# Patient Record
Sex: MALE | URBAN - METROPOLITAN AREA
[De-identification: names, ages, dates, MRNs, and addresses within clinical notes are randomized per-mention and may not be internally consistent; named-entity substitution may affect disease eponyms.]

---

## 2024-03-08 ENCOUNTER — TRANSCRIBE ORDERS (OUTPATIENT)
Dept: CARDIAC REHAB | Facility: HOSPITAL | Age: 64
End: 2024-03-08

## 2024-03-08 DIAGNOSIS — Z94.1 HEART TRANSPLANT RECIPIENT: Primary | ICD-10-CM

## 2024-04-12 ENCOUNTER — TREATMENT (OUTPATIENT)
Dept: CARDIAC REHAB | Facility: HOSPITAL | Age: 64
End: 2024-04-12
Payer: OTHER GOVERNMENT

## 2024-04-12 DIAGNOSIS — Z94.1 HEART TRANSPLANT RECIPIENT: ICD-10-CM

## 2024-04-12 PROCEDURE — 93798 PHYS/QHP OP CAR RHAB W/ECG: CPT

## 2024-04-12 NOTE — PROGRESS NOTES
Attended Phase II Cardiac Rehab orientation. Medication and health history reconciled. Heart and lungs assessed by staff RN. See [facility] for details.

## 2024-04-18 ENCOUNTER — TREATMENT (OUTPATIENT)
Dept: CARDIAC REHAB | Facility: HOSPITAL | Age: 64
End: 2024-04-18
Payer: OTHER GOVERNMENT

## 2024-04-18 DIAGNOSIS — Z94.1 HEART TRANSPLANT RECIPIENT: Primary | ICD-10-CM

## 2024-04-18 PROCEDURE — 93798 PHYS/QHP OP CAR RHAB W/ECG: CPT

## 2024-04-18 PROCEDURE — 93797 PHYS/QHP OP CAR RHAB WO ECG: CPT

## 2024-04-18 NOTE — PROGRESS NOTES
Attended Phase II Cardiac Rehab. No medication or health history changes reported. See [facility] for details.  Introduction to exercise class #2  Exercise session #3

## 2024-04-24 ENCOUNTER — TREATMENT (OUTPATIENT)
Dept: CARDIAC REHAB | Facility: HOSPITAL | Age: 64
End: 2024-04-24
Payer: OTHER GOVERNMENT

## 2024-04-24 DIAGNOSIS — Z94.1 HEART TRANSPLANT RECIPIENT: Primary | ICD-10-CM

## 2024-04-24 PROCEDURE — 93798 PHYS/QHP OP CAR RHAB W/ECG: CPT

## 2024-04-24 NOTE — PROGRESS NOTES
NUTRITION ASSESSMENT & EDUCATION  CARDIAC/PULMONARY REHAB      Appointment Date and Time: 24, 15:54 EDT  Patient Name: José Miguel Capone   Age: 63 y.o.     Sex:  male      Employment/Activity Level:   Tan education level: college  Occupation:  retired                          Job Activity Level: N/A  Routine Exercise: {Desc; mild/moderate/stron}                                    Weight Assessment:   Height:   Ht Readings from Last 1 Encounters:   No data found for Ht     Weight:   Wt Readings from Last 1 Encounters:   No data found for Wt         BMI:    BMI Readings from Last 1 Encounters:   No data found for BMI       -------------------------------------------------------------------------------------------------  IBW: Height is required to calculate ideal body weight.  -------------------------------------------------------------------------------------------------  Weight Assessment: {Wt assess:974409991}  Weight Change last six months: ***       Usual Weight: *** lb       Desired Weight: *** lb    Cardiac Risk Factors:         Atherosclerotic heart disease       Hyperlipidemia/hypercholesterolemia       Obesity      Heart transplant recipient, DM II, PE, gout, GERD, venous insufficiency, ANTONIO    Pertinent Lab Values:     Total Cholesterol   Date Value Ref Range Status   2023 102 0 - 199 mg/dL Final     HDL Cholesterol   Date Value Ref Range Status   2023 32 (L) >=40 mg/dL Final     Comment:     Levels of HDL cholesterol above 60 mg/dL are considered a negative risk factor for coronary heart disease.   Source: NCEP ATP III Expert report, IVANA 2001; 285(19):2486-97; Circulation, 2002;106:3143.     LDL Cholesterol    Date Value Ref Range Status   2023 55 1 - 129 mg/dL Final     Comment:     ATP III Classification of LDL Cholesterol          Optimal        (<100)      mg/dL          Near Optimal   (100-129)   mg/dL          Borderline High(130-159)   mg/dL          High            (160-189)   mg/dL          Very High      (>189)      mg/dL     Triglycerides   Date Value Ref Range Status   11/02/2023 74 <=149 mg/dL Final     Hemoglobin A1C   Date Value Ref Range Status   11/02/2023 5.4 % Final     Comment:         Diabetes Category   Hemoglobin A1c %   Diabetic     >= 6.5 %   Pre-diabetic    5.7-6.4 %   Non-diabetic    <= 5.6 %       Glycemic Targets for Type I and Type II Diabetics    Population    Hemoglobin A1c %   Non-pregnant Adults   < 7.0 %   Pregnant Adults    < 6.0 %   Children and Adolescents  < 7.5 %     Source:  American Diabetes Association. Standards of medical care in diabetes?2015. Diabetes Care. 2015;38(suppl 1):S1-S93.     TSH   Date Value Ref Range Status   11/02/2023 0.373 0.350 - 3.600 mcunit/mL Final     Comment:     Note: Pediatric Reference Ranges based on In-house studies and CALIPER database [email or web address]    Pregnant Females:  1st Trimester: 0.02-2.81 mcU/mL  2nd Trimester: 0.19-2.90 mcU/mL  3rd Trimester: 0.30-3.00 mcU/mL     Glucose   Date Value Ref Range Status   11/22/2023 82 70 - 99 mg/dL Final         Pertinent Medications:   Nutritional Supplements: reviewed  Pertinent Nutrition-Related Medications: Reviewed - no changes recommended at this time.  Self Identified Problems or Concerns:   ***    Nutrition Influences:   Appetite: {good/fair/etc:408444658}           Taste/smell changes:  {Yes/No/WC:962417}  Factors limiting PO intake: none    Food records reviewed?: {kjr_food_review:95877}    José Miguel lives with: wife, Ananya Valdez receives lifestyle support from others at home?: Yes  Spouse/significant other present for diet instruction today?: {Yes/No/WC:274737}    Diet Assessment:   Diet Survey Score: *** of possible 72 =     % compliant with AHA guidelines    Meal intake pattern:  ***  Dining out:  ***               Fast food:  ***    24 hour recall:   B   NEHEMIAH FUNES    Who does the grocery shopping:  ***  Who does the cooking at home:  ***                    "  Home diet review:  {KJR DIET QUALITY:70735}    Motivation level toward diet compliance:  {motivation level:775106855}  Assessment / Recommendations:   Prior diet education before to coming to Cardiac Rehab?: {EXAM; YES/NO:22221::\"No\"}  Instructed provided on:  American Heart Association 2021 Nutrition Guidelines, Saturated Fat, Picking Healthy Proteins, Get the Scoop on Sodium/Salt <2300 mg/d, Added Sugars Guidance, Go Nuts for Heart Health, [place] 3 fats in Fish & Seafood, Dining Out Doesn't Mean Ditch Your Diet, and Foods to Avoid on the Cardiac Diet  Written materials provided to patient: {Yes-No-Declined:04470}    Goals/Plan:   Patient defined goals:   1)  2)    Plan:  Encouraged to complete goals and continue setting new nutrition/exercise goals             Encouraged to follow up with dietitian during cardiac rehab sessions; may request additional RD counseling.    Lisy Santiago RD, 15:54 EDT - 4/24/2024      "

## 2024-04-26 ENCOUNTER — TREATMENT (OUTPATIENT)
Dept: CARDIAC REHAB | Facility: HOSPITAL | Age: 64
End: 2024-04-26
Payer: OTHER GOVERNMENT

## 2024-04-26 DIAGNOSIS — Z94.1 HEART TRANSPLANT RECIPIENT: Primary | ICD-10-CM

## 2024-04-26 PROCEDURE — 93798 PHYS/QHP OP CAR RHAB W/ECG: CPT

## 2024-04-29 ENCOUNTER — APPOINTMENT (OUTPATIENT)
Dept: CARDIAC REHAB | Facility: HOSPITAL | Age: 64
End: 2024-04-29
Payer: OTHER GOVERNMENT

## 2024-05-01 ENCOUNTER — APPOINTMENT (OUTPATIENT)
Dept: CARDIAC REHAB | Facility: HOSPITAL | Age: 64
End: 2024-05-01
Payer: OTHER GOVERNMENT

## 2024-05-03 ENCOUNTER — TREATMENT (OUTPATIENT)
Dept: CARDIAC REHAB | Facility: HOSPITAL | Age: 64
End: 2024-05-03
Payer: OTHER GOVERNMENT

## 2024-05-03 DIAGNOSIS — Z94.1 HEART TRANSPLANT RECIPIENT: Primary | ICD-10-CM

## 2024-05-03 PROCEDURE — 93798 PHYS/QHP OP CAR RHAB W/ECG: CPT

## 2024-05-13 ENCOUNTER — TREATMENT (OUTPATIENT)
Dept: CARDIAC REHAB | Facility: HOSPITAL | Age: 64
End: 2024-05-13
Payer: OTHER GOVERNMENT

## 2024-05-13 DIAGNOSIS — Z94.1 HEART TRANSPLANT RECIPIENT: Primary | ICD-10-CM

## 2024-05-13 PROCEDURE — 93798 PHYS/QHP OP CAR RHAB W/ECG: CPT

## 2024-05-22 ENCOUNTER — TREATMENT (OUTPATIENT)
Dept: CARDIAC REHAB | Facility: HOSPITAL | Age: 64
End: 2024-05-22
Payer: OTHER GOVERNMENT

## 2024-05-22 ENCOUNTER — DOCUMENTATION (OUTPATIENT)
Dept: CARDIAC REHAB | Facility: HOSPITAL | Age: 64
End: 2024-05-22
Payer: OTHER GOVERNMENT

## 2024-05-22 DIAGNOSIS — Z94.1 HEART TRANSPLANT RECIPIENT: Primary | ICD-10-CM

## 2024-05-22 PROCEDURE — 93797 PHYS/QHP OP CAR RHAB WO ECG: CPT

## 2024-05-22 NOTE — PROGRESS NOTES
NUTRITION ASSESSMENT & EDUCATION  CARDIAC/PULMONARY REHAB      Appointment Date and Time: 05/22/24, 10:33 EDT  Patient Name: José Miguel Capone   Age: 64 y.o.     Sex:  male      Employment/Activity Level:   Tan education level: college  Occupation:  retired from [employer]                          Job Activity Level: N/A  Routine Exercise: moderate, bicycle, grocery shopping, gardening/yard                                    Weight Assessment:   Height:   Ht Readings from Last 1 Encounters:   No data found for Ht     Weight:   Wt Readings from Last 1 Encounters:   No data found for Wt         BMI:    BMI Readings from Last 1 Encounters:   No data found for BMI       -------------------------------------------------------------------------------------------------  IBW: Height is required to calculate ideal body weight.  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   Source: NCEP ATP III Expert report, IVANA 2001; 285(19):2486-97; Circulation, 2002;106:3143.     LDL Cholesterol    Date Value Ref Range Status   11/02/2023 55 1 - 129 mg/dL Final     Comment:     ATP III Classification of LDL Cholesterol          Optimal        (<100)      mg/dL           Near Optimal   (100-129)   mg/dL          Borderline High(130-159)   mg/dL          High           (160-189)   mg/dL          Very High      (>189)      mg/dL     Triglycerides   Date Value Ref Range Status   11/02/2023 74 <=149 mg/dL Final     Hemoglobin A1C   Date Value Ref Range Status   11/02/2023 5.4 % Final     Comment:         Diabetes Category   Hemoglobin A1c %   Diabetic     >= 6.5 %   Pre-diabetic    5.7-6.4 %   Non-diabetic    <= 5.6 %       Glycemic Targets for Type I and Type II Diabetics    Population    Hemoglobin A1c %   Non-pregnant Adults   < 7.0 %   Pregnant Adults    < 6.0 %   Children and Adolescents  < 7.5 %     Source:  American Diabetes Association. Standards of medical care in diabetes?2015. Diabetes Care. 2015;38(suppl 1):S1-S93.     TSH   Date Value Ref Range Status   11/02/2023 0.373 0.350 - 3.600 mcunit/mL Final     Comment:     Note: Pediatric Reference Ranges based on In-house studies and CALIPER database [email or web address]    Pregnant Females:  1st Trimester: 0.02-2.81 mcU/mL  2nd Trimester: 0.19-2.90 mcU/mL  3rd Trimester: 0.30-3.00 mcU/mL     Glucose   Date Value Ref Range Status   11/22/2023 82 70 - 99 mg/dL Final     Reviewed labs today with José Miguel. He reports new labs taken this week.    Pertinent Medications:   Nutritional Supplements: reviewed  Pertinent Nutrition-Related Medications: Reviewed - no changes recommended at this time.  Self Identified Problems or Concerns:   I know what I should eat, I don't always follow or apply this.    Nutrition Influences:   Appetite: good            Taste/smell changes:  No  Factors limiting PO intake: none    Food records reviewed?: Extensive review and discussion of home diet today.    José Miguel lives with: wife and son Ananya Valdez receives lifestyle support from others at home?: Yes  Spouse/significant other present for diet instruction today?: No    Diet Assessment:   Diet Survey Score: pending    Meal intake pattern:  2 meals/day,  following time restricted diet allowing 18 h fast/6 hour eating  Dining out:  no               Fast food:  yes, twice weekly    Who does the grocery shopping:  both  Who does the cooking at home:  both                     Home diet review:  Current diet is high in Saturated Fat intake, much room for improvement, Moderate-acceptable Sodium intake, aware of sodium guidelines and strives to reduce intake, High in eating out- restaurant and/or fast food- potential for excess portioning, sodium, saturated fats, Deficient in one or more food categories, Expressed motivation to make heart healthy changes in diet today, and Anticipate little-moderate change in diet at this time    Motivation level toward diet compliance:  moderate  Assessment / Recommendations:   Prior diet education before to coming to Cardiac Rehab?: No  Instructed provided on:  American Heart Association 2021 Nutrition Guidelines, Saturated Fat, Picking Healthy Proteins, Get the Scoop on Sodium/Salt <2300 mg/d, Added Sugars Guidance, Go Nuts for Heart Health, [place] 3 fats in Fish & Seafood, Dining Out Doesn't Mean Ditch Your Diet, and Foods to Avoid on the Cardiac Diet  Written materials provided to patient: Yes    Goals/Plan:   Patient defined goals: will follow for the next 2 weeks and evaluate progress  1) Decrease the amount/frequency of fried foods  2) Have fruit serving daily  3) Increase fish in diet  4) Increase bean intake    Plan:  Encouraged to complete goals and continue setting new nutrition/exercise goals             Encouraged to follow up with dietitian during cardiac rehab sessions; may request additional RD counseling.    Lisy Santiago RD, 10:33 EDT - 5/22/2024

## 2024-05-22 NOTE — PROGRESS NOTES
PT with blood glucose of 386 on arrival at cardiac rehab. Due to policy we will not be able to exercise patient today.

## 2024-05-29 ENCOUNTER — APPOINTMENT (OUTPATIENT)
Dept: CARDIAC REHAB | Facility: HOSPITAL | Age: 64
End: 2024-05-29
Payer: OTHER GOVERNMENT

## 2024-05-29 ENCOUNTER — TELEPHONE (OUTPATIENT)
Dept: CARDIAC REHAB | Facility: HOSPITAL | Age: 64
End: 2024-05-29
Payer: OTHER GOVERNMENT

## 2024-05-29 NOTE — TELEPHONE ENCOUNTER
Staff contacted Patient regarding his absence from Phase II Cardiac Rehab. Staff left message with Patient stating that he had until Wednesday, June 5th to return to the program or to return staff's phone call or he would be discharged from the program at that time.

## 2024-05-31 ENCOUNTER — APPOINTMENT (OUTPATIENT)
Dept: CARDIAC REHAB | Facility: HOSPITAL | Age: 64
End: 2024-05-31
Payer: OTHER GOVERNMENT